# Patient Record
Sex: MALE | Race: WHITE | NOT HISPANIC OR LATINO | Employment: OTHER | URBAN - METROPOLITAN AREA
[De-identification: names, ages, dates, MRNs, and addresses within clinical notes are randomized per-mention and may not be internally consistent; named-entity substitution may affect disease eponyms.]

---

## 2024-03-04 LAB — GLUCOSE BLD MANUAL STRIP-MCNC: 158 MG/DL (ref 74–99)

## 2024-03-04 PROCEDURE — 12005 RPR S/N/A/GEN/TRK12.6-20.0CM: CPT | Mod: 59

## 2024-03-04 PROCEDURE — 99284 EMERGENCY DEPT VISIT MOD MDM: CPT | Mod: 25

## 2024-03-04 PROCEDURE — 82947 ASSAY GLUCOSE BLOOD QUANT: CPT

## 2024-03-04 RX ORDER — CYANOCOBALAMIN 1000 UG/ML
INJECTION, SOLUTION INTRAMUSCULAR; SUBCUTANEOUS
COMMUNITY
Start: 2022-12-09

## 2024-03-04 RX ORDER — METOPROLOL TARTRATE 25 MG/1
TABLET, FILM COATED ORAL
COMMUNITY
Start: 2022-12-09

## 2024-03-04 RX ORDER — DEXTROMETHORPHAN HYDROBROMIDE, GUAIFENESIN 5; 100 MG/5ML; MG/5ML
LIQUID ORAL
COMMUNITY
Start: 2022-12-09

## 2024-03-04 RX ORDER — ATORVASTATIN CALCIUM 40 MG/1
TABLET, FILM COATED ORAL
COMMUNITY
Start: 2022-12-09

## 2024-03-04 RX ORDER — ASPIRIN 81 MG/1
TABLET ORAL
COMMUNITY
Start: 2022-12-09

## 2024-03-04 RX ORDER — CLOPIDOGREL BISULFATE 75 MG/1
TABLET ORAL
COMMUNITY
Start: 2022-12-09

## 2024-03-04 RX ORDER — LISINOPRIL 2.5 MG/1
TABLET ORAL
COMMUNITY
Start: 2022-12-09

## 2024-03-04 RX ORDER — INSULIN HUMAN 100 [IU]/ML
INJECTION, SUSPENSION SUBCUTANEOUS
COMMUNITY
Start: 2022-12-09

## 2024-03-04 RX ORDER — METFORMIN HYDROCHLORIDE 1000 MG/1
TABLET ORAL
COMMUNITY
Start: 2022-12-09

## 2024-03-04 ASSESSMENT — PAIN DESCRIPTION - LOCATION: LOCATION: HAND

## 2024-03-04 ASSESSMENT — COLUMBIA-SUICIDE SEVERITY RATING SCALE - C-SSRS
1. IN THE PAST MONTH, HAVE YOU WISHED YOU WERE DEAD OR WISHED YOU COULD GO TO SLEEP AND NOT WAKE UP?: NO
6. HAVE YOU EVER DONE ANYTHING, STARTED TO DO ANYTHING, OR PREPARED TO DO ANYTHING TO END YOUR LIFE?: NO
2. HAVE YOU ACTUALLY HAD ANY THOUGHTS OF KILLING YOURSELF?: NO

## 2024-03-04 ASSESSMENT — PAIN - FUNCTIONAL ASSESSMENT: PAIN_FUNCTIONAL_ASSESSMENT: 0-10

## 2024-03-04 ASSESSMENT — PAIN DESCRIPTION - ORIENTATION: ORIENTATION: RIGHT

## 2024-03-04 ASSESSMENT — PAIN SCALES - GENERAL: PAINLEVEL_OUTOF10: 8

## 2024-03-05 ENCOUNTER — HOSPITAL ENCOUNTER (EMERGENCY)
Facility: HOSPITAL | Age: 79
Discharge: HOME | End: 2024-03-05
Attending: INTERNAL MEDICINE
Payer: MEDICARE

## 2024-03-05 ENCOUNTER — APPOINTMENT (OUTPATIENT)
Dept: RADIOLOGY | Facility: HOSPITAL | Age: 79
End: 2024-03-05
Payer: MEDICARE

## 2024-03-05 VITALS
WEIGHT: 212 LBS | HEIGHT: 75 IN | DIASTOLIC BLOOD PRESSURE: 75 MMHG | BODY MASS INDEX: 26.36 KG/M2 | SYSTOLIC BLOOD PRESSURE: 133 MMHG | TEMPERATURE: 98.6 F | HEART RATE: 93 BPM | OXYGEN SATURATION: 96 % | RESPIRATION RATE: 20 BRPM

## 2024-03-05 DIAGNOSIS — S61.401A AVULSION OF SKIN OF RIGHT HAND, INITIAL ENCOUNTER: Primary | ICD-10-CM

## 2024-03-05 LAB
ALBUMIN SERPL BCP-MCNC: 3.9 G/DL (ref 3.4–5)
ALP SERPL-CCNC: 56 U/L (ref 33–136)
ALT SERPL W P-5'-P-CCNC: 15 U/L (ref 10–52)
ANION GAP SERPL CALC-SCNC: 18 MMOL/L (ref 10–20)
APTT PPP: 30 SECONDS (ref 27–38)
AST SERPL W P-5'-P-CCNC: 20 U/L (ref 9–39)
BASOPHILS # BLD AUTO: 0.07 X10*3/UL (ref 0–0.1)
BASOPHILS NFR BLD AUTO: 0.7 %
BILIRUB SERPL-MCNC: 0.4 MG/DL (ref 0–1.2)
BUN SERPL-MCNC: 21 MG/DL (ref 6–23)
CALCIUM SERPL-MCNC: 9.7 MG/DL (ref 8.6–10.3)
CHLORIDE SERPL-SCNC: 101 MMOL/L (ref 98–107)
CO2 SERPL-SCNC: 22 MMOL/L (ref 21–32)
CREAT SERPL-MCNC: 1.09 MG/DL (ref 0.5–1.3)
EGFRCR SERPLBLD CKD-EPI 2021: 69 ML/MIN/1.73M*2
EOSINOPHIL # BLD AUTO: 0.4 X10*3/UL (ref 0–0.4)
EOSINOPHIL NFR BLD AUTO: 3.8 %
ERYTHROCYTE [DISTWIDTH] IN BLOOD BY AUTOMATED COUNT: 13.8 % (ref 11.5–14.5)
GLUCOSE SERPL-MCNC: 93 MG/DL (ref 74–99)
HCT VFR BLD AUTO: 42.5 % (ref 41–52)
HGB BLD-MCNC: 13.6 G/DL (ref 13.5–17.5)
IMM GRANULOCYTES # BLD AUTO: 0.03 X10*3/UL (ref 0–0.5)
IMM GRANULOCYTES NFR BLD AUTO: 0.3 % (ref 0–0.9)
INR PPP: 1 (ref 0.9–1.1)
LYMPHOCYTES # BLD AUTO: 2.36 X10*3/UL (ref 0.8–3)
LYMPHOCYTES NFR BLD AUTO: 22.5 %
MCH RBC QN AUTO: 27.6 PG (ref 26–34)
MCHC RBC AUTO-ENTMCNC: 32 G/DL (ref 32–36)
MCV RBC AUTO: 86 FL (ref 80–100)
MONOCYTES # BLD AUTO: 1.15 X10*3/UL (ref 0.05–0.8)
MONOCYTES NFR BLD AUTO: 11 %
NEUTROPHILS # BLD AUTO: 6.46 X10*3/UL (ref 1.6–5.5)
NEUTROPHILS NFR BLD AUTO: 61.7 %
NRBC BLD-RTO: 0 /100 WBCS (ref 0–0)
PLATELET # BLD AUTO: 220 X10*3/UL (ref 150–450)
POTASSIUM SERPL-SCNC: 5.1 MMOL/L (ref 3.5–5.3)
PROT SERPL-MCNC: 6.5 G/DL (ref 6.4–8.2)
PROTHROMBIN TIME: 11.3 SECONDS (ref 9.8–12.8)
RBC # BLD AUTO: 4.93 X10*6/UL (ref 4.5–5.9)
SODIUM SERPL-SCNC: 136 MMOL/L (ref 136–145)
WBC # BLD AUTO: 10.5 X10*3/UL (ref 4.4–11.3)

## 2024-03-05 PROCEDURE — 85610 PROTHROMBIN TIME: CPT | Performed by: INTERNAL MEDICINE

## 2024-03-05 PROCEDURE — 90471 IMMUNIZATION ADMIN: CPT | Performed by: INTERNAL MEDICINE

## 2024-03-05 PROCEDURE — 96374 THER/PROPH/DIAG INJ IV PUSH: CPT | Mod: 59

## 2024-03-05 PROCEDURE — 29125 APPL SHORT ARM SPLINT STATIC: CPT | Mod: RT

## 2024-03-05 PROCEDURE — 2500000004 HC RX 250 GENERAL PHARMACY W/ HCPCS (ALT 636 FOR OP/ED): Performed by: INTERNAL MEDICINE

## 2024-03-05 PROCEDURE — 80053 COMPREHEN METABOLIC PANEL: CPT | Performed by: INTERNAL MEDICINE

## 2024-03-05 PROCEDURE — 36415 COLL VENOUS BLD VENIPUNCTURE: CPT | Performed by: INTERNAL MEDICINE

## 2024-03-05 PROCEDURE — 2500000005 HC RX 250 GENERAL PHARMACY W/O HCPCS: Performed by: INTERNAL MEDICINE

## 2024-03-05 PROCEDURE — 73130 X-RAY EXAM OF HAND: CPT | Mod: RT

## 2024-03-05 PROCEDURE — 90715 TDAP VACCINE 7 YRS/> IM: CPT | Performed by: INTERNAL MEDICINE

## 2024-03-05 PROCEDURE — 85025 COMPLETE CBC W/AUTO DIFF WBC: CPT | Performed by: INTERNAL MEDICINE

## 2024-03-05 PROCEDURE — 73130 X-RAY EXAM OF HAND: CPT | Mod: RIGHT SIDE | Performed by: RADIOLOGY

## 2024-03-05 RX ORDER — LIDOCAINE HYDROCHLORIDE AND EPINEPHRINE 10; 10 MG/ML; UG/ML
10 INJECTION, SOLUTION INFILTRATION; PERINEURAL ONCE
Status: COMPLETED | OUTPATIENT
Start: 2024-03-05 | End: 2024-03-05

## 2024-03-05 RX ADMIN — LIDOCAINE HYDROCHLORIDE AND EPINEPHRINE 10 ML: 10; 10 INJECTION, SOLUTION INFILTRATION; PERINEURAL at 01:20

## 2024-03-05 RX ADMIN — HYDROMORPHONE HYDROCHLORIDE 0.2 MG: 0.2 INJECTION, SOLUTION INTRAMUSCULAR; INTRAVENOUS; SUBCUTANEOUS at 01:45

## 2024-03-05 RX ADMIN — TETANUS TOXOID, REDUCED DIPHTHERIA TOXOID AND ACELLULAR PERTUSSIS VACCINE, ADSORBED 0.5 ML: 5; 2.5; 8; 8; 2.5 SUSPENSION INTRAMUSCULAR at 01:20

## 2024-03-05 NOTE — ED PROVIDER NOTES
"HPI     CC: Hand Injury     HPI: Peter Loredo is a 78 y.o. 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  Patient fell against a door handle around 2:00 PM today, avulsing the skin of the right hand dorsum.  He is currently taking cefuroxime which she was prescribed for some cold symptoms.  A family member who is a nurse practitioner bandaged the wound but he reportedly \"lost a lot of blood.\"  He denies any dizziness, chest pain, shortness of breath, or other symptoms leading to the fall.  Attributes it to his chronic balance issues.  He denies any numbness, tingling, change in color to the hand other than some bruising and swelling.    ROS: 10-point review of systems was performed and is otherwise negative except as noted in HPI.    Limitations to history: N/A    Independent Historians: Multiple family members at bedside    External Records Reviewed: N/A    Past Medical History: Noncontributory except per HPI     Past Surgical History: Noncontributory except per HPI     Family History: Reviewed and noncontributory     Social History:  Denies tobacco. Denies ETOH. Denies illicit drugs.    Social Determinants Affecting Care: N/A    Allergies   Allergen Reactions    Ciprofloxacin (Bulk) Anaphylaxis       Home Meds:   Current Outpatient Medications   Medication Instructions    acetaminophen (Tylenol Arthritis Pain) 650 mg ER tablet 2 tablet EVERY 8 HOURS (route: oral)    aspirin 81 mg EC tablet 1 tablet DAILY (route: oral)    atorvastatin (Lipitor) 40 mg tablet 1 tablet DAILY (route: oral)    clopidogrel (Plavix) 75 mg tablet 1 tablet DAILY (route: oral)    cyanocobalamin (Vitamin B-12) 1,000 mcg/mL injection 1 mL MONTHLY (route: injection)    insulin NPH and regular human (HumuLIN 70/30 U-100 KwikPen) 100 unit/mL (70-30) injection Per instructions 2 TIMES DAILY (route: subcutaneous)    lisinopril 2.5 mg tablet 1 tablet DAILY (route: oral)    " "metFORMIN (Glucophage) 1,000 mg tablet 1 tablet 2 TIMES DAILY (route: oral)    metoprolol tartrate (Lopressor) 25 mg tablet 12.5 mg DAILY (route: oral)        Physical Exam     ED Triage Vitals [03/04/24 2024]   Temperature Heart Rate Respirations BP   37 °C (98.6 °F) 90 18 131/75      Pulse Ox Temp src Heart Rate Source Patient Position   95 % -- -- --      BP Location FiO2 (%)     -- --         Heart Rate:  [89-90]   Temperature:  [37 °C (98.6 °F)]   Respirations:  [18-20]   BP: (131)/(71-75)   Height:  [190.5 cm (6' 3\")]   Weight:  [96.2 kg (212 lb)]   Pulse Ox:  [95 %-96 %]      Physical Exam  Vitals and nursing note reviewed.     CONSTITUTIONAL: Well appearing, well nourished, in no acute distress.   HENMT: Head atraumatic. Airway patent. Nasal mucosa clear. Mouth with normal mucosa, clear oropharynx. Uvula midline. Neck supple.    EYES: Clear bilaterally, pupils equally round and reactive to light.   CARDIOVASCULAR: Normal rate, regular rhythm.  Heart sounds S1, S2.  No murmurs, rubs or gallops. Normal pulses. Capillary refill < 2 sec.   RESPIRATORY: No increased work of breathing. Breath sounds clear and equal bilaterally.  GASTROINTESTINAL: Abdomen soft, non-distended, non-tender. No rebound, no guarding. Normal bowel sounds. No palpable masses.  GENITOURINARY:  No CVA tenderness.  MUSCULOSKELETAL: Large can avulsion R hand dorsum with visible vasculature and extensor tendons, clotted blood and mild oozing, no pulsatile flow.  SILT, FROM, 2+ RP.  Spine appears normal, range of motion is not limited, no muscle or joint tenderness. No edema.   NEUROLOGICAL: Alert and oriented, no asymmetry, moving all extremities equally.  SKIN: Warm, dry and intact. No rash or notable lesions.  PSYCHIATRIC: Normal mood and affect.  HEME/LYMPH: No adenopathy or splenomegaly.      Diagnostic Results      ECG: ECGs read and interpreted by me. See ED Course, below, for interpretation.    Labs Reviewed   CBC WITH AUTO DIFFERENTIAL " - Abnormal       Result Value    WBC 10.5      nRBC 0.0      RBC 4.93      Hemoglobin 13.6      Hematocrit 42.5      MCV 86      MCH 27.6      MCHC 32.0      RDW 13.8      Platelets 220      Neutrophils % 61.7      Immature Granulocytes %, Automated 0.3      Lymphocytes % 22.5      Monocytes % 11.0      Eosinophils % 3.8      Basophils % 0.7      Neutrophils Absolute 6.46 (*)     Immature Granulocytes Absolute, Automated 0.03      Lymphocytes Absolute 2.36      Monocytes Absolute 1.15 (*)     Eosinophils Absolute 0.40      Basophils Absolute 0.07     POCT GLUCOSE - Abnormal    POCT Glucose 158 (*)    COMPREHENSIVE METABOLIC PANEL - Normal    Glucose 93      Sodium 136      Potassium 5.1      Chloride 101      Bicarbonate 22      Anion Gap 18      Urea Nitrogen 21      Creatinine 1.09      eGFR 69      Calcium 9.7      Albumin 3.9      Alkaline Phosphatase 56      Total Protein 6.5      AST 20      Bilirubin, Total 0.4      ALT 15     COAGULATION SCREEN - Normal    Protime 11.3      INR 1.0      aPTT 30      Narrative:     The APTT is no longer used for monitoring Unfractionated Heparin Therapy. For monitoring Heparin Therapy, use the Heparin Assay.         XR hand right 3+ views   Final Result   No acute osseous abnormality identified.  Soft tissue injury along the   dorsal aspect of the hand without evidence of foreign body.   Signed by Jourdan Osorio                    [place] Coma Scale Score: 15                  Procedure  Laceration Repair    Performed by: Annabelle Conti MD  Authorized by: Annabelle Conti MD    Consent:     Consent obtained:  Verbal    Consent given by:  Patient    Risks, benefits, and alternatives were discussed: yes    Universal protocol:     Procedure explained and questions answered to patient or proxy's satisfaction: yes      Patient identity confirmed:  Verbally with patient  Anesthesia:     Anesthesia method:  Local infiltration    Local anesthetic:  Lidocaine 1% WITH  epi  Laceration details:     Location:  Hand    Hand location:  R hand, dorsum    Length (cm):  20    Depth (mm):  5  Pre-procedure details:     Preparation:  Patient was prepped and draped in usual sterile fashion and imaging obtained to evaluate for foreign bodies  Exploration:     Limited defect created (wound extended): no      Hemostasis achieved with:  Direct pressure    Imaging obtained: x-ray      Imaging outcome: foreign body not noted      Wound exploration: wound explored through full range of motion      Wound extent: no foreign bodies/material noted, no muscle damage noted, no nerve damage noted, no tendon damage noted, no underlying fracture noted and no vascular damage noted      Contaminated: no    Treatment:     Area cleansed with:  Saline    Amount of cleaning:  Extensive    Irrigation solution:  Sterile saline    Irrigation volume:  50    Irrigation method:  Pressure wash    Debridement:  None    Undermining:  None    Scar revision: no    Skin repair:     Repair method:  Sutures    Suture size:  5-0    Suture material:  Prolene    Suture technique:  Simple interrupted    Number of sutures:  11  Approximation:     Approximation:  Loose  Repair type:     Repair type:  Intermediate  Post-procedure details:     Dressing:  Bulky dressing and splint for protection (xeroform)    Procedure completion:  Tolerated well, no immediate complications      ED Course & MDM   Assessment/Plan:   Peter Loredo is a 78 y.o. 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  He is neurovascularly intact and wound is hemostatic at this time.  He was given tetanus and a dose of Dilaudid for pain.  He is already receiving cefuroxime for a respiratory infection. See below for details of ED course and ultimate disposition.    Medications   diphth,pertus(acell),tetanus (BoostRIX) 2.5-8-5  Lf-mcg-Lf/0.5mL vaccine 0.5 mL (has no administration in time range)   HYDROmorphone PF (Dilaudid) injection 0.2 mg (has no administration in time range)   lidocaine-epinephrine (Xylocaine W/EPI) 1 %-1:100,000 injection 10 mL (10 mL infiltration Given 3/5/24 0120)        ED Course as of 03/07/24 1542   Tue Mar 05, 2024   0204 X-ray reviewed with no foreign body or fracture.  Labs unremarkable including normal hemoglobin.  Spoke with Dr. Marks from hand. He recommends light irrigation, tacking stitches, xeroform, bulky gauze, and a volar splint, with Ortho hand follow-up.  This was performed with 11 5-0 nonabsorbable sutures.  Medic applied volar splint at bedside.  Patient tolerated the procedure well.  Referral was made for follow-up with Dr. Marks.  Patient was discharged home with anticipatory guidance and strict return precautions. [CG]      ED Course User Index  [CG] Annabelle Conti MD         Diagnoses as of 03/07/24 1542   Avulsion of skin of right hand, initial encounter       Disposition:   DISCHARGE.  The patient was discharged with both verbal and written anticipatory guidance and strict return precautions. Discharge diagnosis, instructions and plan were discussed and understood. I emphasized the importance of following up with their primary care provider within 24-48 hours and with any referrals in the timeframe recommended. At the time of discharge the patient was comfortable and was in no apparent distress. Patient is aware of diagnostic uncertainty and was notified though testing is negative here, there is a very small chance that pathology may be missed.  The patient understands these risks and the patient/family understood to call 911 or return immediately to the emergency department if the symptoms worsen or if they have any additional concerns.      FOLLOW UP  Primary care provider in 1-2 days.      ED Prescriptions    None         Annabelle Conti MD  EM/IM/Peds    This note was  dictated by speech recognition. Minor errors in transcription may be present.     Annabelle Conti MD  03/07/24 0729

## 2024-03-05 NOTE — DISCHARGE INSTRUCTIONS
You were seen today for a hand avulsion injury.  We closed your wound with 11 sutures, applied a Xeroform dressing, bulky splint and you should follow-up and have sutures removed with Ortho hand within a week.  Your evaluation was not concerning for an emergency at this time. Please see the attached information sheet for information about your condition, how to care for your condition at home, and reasons to return to the emergency department. Take any prescriptions written today as prescribed. You should call your primary care provider within 24 hours to tell them about today's visit, including any new medications or medication changes, as he or she may want to see you in the office for further evaluation. If you do not have a primary care provider, call  [phone] for an appointment. We offer in-person office visits as well as virtual options. Please do not hesitate to call  [phone] or return to the emergency department with any new or unresolved concerns or symptoms. Thank you for choosing [facility] for your care.

## 2024-03-05 NOTE — ED TRIAGE NOTES
PT PRESENTS TO THE ED FOR HAND INJURY TODAY AT HOME. PT FELL AND TORE HIS SKIN ON HIS HAND. PER FAMILY HE HAS VERY THIN SKIN AND LEEROY THRW IT. PER FAMILY YOU WERE ABLE TO SEE BONES. PTS HAND IS CURRENTLY WRAPPED AND NOT BLEEDING THROUGH. PT STATES THAT HE IS A DIABETIC AND ON BLOOD THINNERS. PT DENIES PASSING OUT. PT DENIES HEAD, NECK OR BACK PAIN. PT HAS HX OF STENT IN PAST AND THAT IS WHY HE IS ON BLOOD THINNERS. PT DENIES CHEST PAIN OR SOB.

## 2024-03-10 NOTE — PROGRESS NOTES
Plastic Surgery Clinic Visit    Patient Name: Peter Loredo  MRN: [id number]    History of Present Illness  Peter Loredo is a 78 y.o. 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 ED on 3/5 with right hand injury after he fell against a door handle, avulsing the skin of the right hand dorsum. Attributes the fall to his chronic balance issues. X-ray with no foreign body or fracture. Ortho hand consulted in the ED and recommended light irrigation, tacking stitches, xeroform, bulky gauze, and a volar splint. Prior to repair, R hand dorsum with visible vasculature and extensor tendons. Repaired in the ED with 5-0 prolene. Presents to plastic surgery clinic today for wound evaluation and possible need for soft tissue coverage.     Subjective  Wife and niece at bedside. Patient states that his bulky dressing has not been removed since it was initially placed when he had his injury. Notes pain over his wound when he flexes his wrist. Denies fever, chest pain, SOB, abd pain, n/v/d, numbness, tingling to R hand.    Past Medical History:   Diagnosis Date    Aortic aneurysm (CMS/HCC)     Diabetes mellitus (CMS/HCC)     Hypertension      Past Surgical History:   Procedure Laterality Date    BYPASS GRAFT      X4     Allergies   Allergen Reactions    Ciprofloxacin (Bulk) Anaphylaxis       Current Outpatient Medications:     acetaminophen (Tylenol Arthritis Pain) 650 mg ER tablet, 2 tablet EVERY 8 HOURS (route: oral), Disp: , Rfl:     aspirin 81 mg EC tablet, 1 tablet DAILY (route: oral), Disp: , Rfl:     atorvastatin (Lipitor) 40 mg tablet, 1 tablet DAILY (route: oral), Disp: , Rfl:     clopidogrel (Plavix) 75 mg tablet, 1 tablet DAILY (route: oral), Disp: , Rfl:     cyanocobalamin (Vitamin B-12) 1,000 mcg/mL injection, 1 mL MONTHLY (route: injection), Disp: , Rfl:     insulin NPH and regular human (HumuLIN 70/30 U-100 KwikPen) 100 unit/mL (70-30)  injection, Per instructions 2 TIMES DAILY (route: subcutaneous), Disp: , Rfl:     lisinopril 2.5 mg tablet, 1 tablet DAILY (route: oral), Disp: , Rfl:     metFORMIN (Glucophage) 1,000 mg tablet, 1 tablet 2 TIMES DAILY (route: oral), Disp: , Rfl:     metoprolol tartrate (Lopressor) 25 mg tablet, 12.5 mg DAILY (route: oral), Disp: , Rfl:    No family history on file.  Social History     Tobacco Use    Smoking status: Former     Types: Cigarettes     Quit date:      Years since quittin.2    Smokeless tobacco: Never   Substance Use Topics    Drug use: Never     Objective  Physical Exam   Constitutional: NAD  Eyes: EOMI, clear sclera   ENMT: Moist mucous membranes, no apparent injuries or lesions  Head/Neck: NCAT  Cardiovascular: Extremities WWP  Respiratory/Thorax: Unlabored respirations on RA  Gastrointestinal: Abdomen soft, non-distended, non-tender  Extremities: RUE: R hand dorsum incision c/d/intact with non-absorbable sutures. SILT. NV intact. Limited wrist ROM 2/2 wound tenderness. Mild UE edema. Wound without bleeding, drainage. Bruising to digits 1-2.  Neurological: A&Ox3  Psychological: Appropriate mood and behavior  Skin: Warm and dry with no lesions or rashes        Diagnostics   XR hand right 3+ views  Result Date: 3/5/2024  STUDY: Hand Radiographs; 3/5/2024 01:42AM INDICATION: Right hand laceration. COMPARISON: None available. ACCESSION NUMBER(S): [id number] ORDERING CLINICIAN: TOMMIE RICARDO TECHNIQUE:  Three view(s) of the right hand. FINDINGS:  There is no displaced fracture.  The alignment is anatomic. Generalized osseous demineralization is noted.  There is mild joint space narrowing throughout the interphalangeal joints.  Mild joint space narrowing is seen of the MCP joints.  Soft tissue injury and swelling seen along the dorsal aspect of the hand. No acute osseous abnormality identified.  Soft tissue injury along the dorsal aspect of the hand without evidence of foreign body. Signed by  Jourdan Osorio    Assessment/Plan  Plan for local wound care at this time and allow wound to declare itself. Patient may need future soft tissue coverage/STSG if he continues to have delayed/poor wound healing.     #Wound to dorsum of R hand  - Wound care: Apply 0.5-1 inch Nitrobid paste to the wound, apply xeroform, abd pad, kerlix wrap, splint twice a day.   - Nitrobid for 5 days only  - After 5 days, discontinue nitropaste and continue with xeroform only  - Patient educated on potential side effects of nitropaste which include but are not limited to HA, dizziness, lightheadedness, hypotension. Due to patient's high risk of falling, patient and wife educated to monitor for these signs/symptoms. Instructed to reach out to our office if any of these symptoms occur.  - Monitor for signs of infection which include increased redness, swelling, fever/chills, green/yellow drainage, or foul odor from incision  - Follow up 3/18 for wound monitoring and suture removal   - If patient continues to need local wound care after subsequent visits, may need to establish home care at next follow up appointment for wound supplies. Wife states she has plenty of supplies in the interim.    Patient and plan discussed with BASILIO Rivera-C  Plastic and Reconstructive Surgery  Epic chat, Pager [phone], Team phones: [phone]

## 2024-03-11 ENCOUNTER — OFFICE VISIT (OUTPATIENT)
Dept: PLASTIC SURGERY | Facility: CLINIC | Age: 79
End: 2024-03-11
Payer: MEDICARE

## 2024-03-11 VITALS — SYSTOLIC BLOOD PRESSURE: 114 MMHG | DIASTOLIC BLOOD PRESSURE: 79 MMHG | HEART RATE: 118 BPM

## 2024-03-11 DIAGNOSIS — Z48.00 CHANGE OR REMOVAL OF NONSURGICAL WOUND DRESSING: ICD-10-CM

## 2024-03-11 DIAGNOSIS — S61.401A AVULSION OF SKIN OF HAND, RIGHT, INITIAL ENCOUNTER: Primary | ICD-10-CM

## 2024-03-11 PROCEDURE — 1036F TOBACCO NON-USER: CPT

## 2024-03-11 PROCEDURE — 99203 OFFICE O/P NEW LOW 30 MIN: CPT

## 2024-03-11 PROCEDURE — 1159F MED LIST DOCD IN RCRD: CPT

## 2024-03-11 PROCEDURE — 1125F AMNT PAIN NOTED PAIN PRSNT: CPT

## 2024-03-11 RX ORDER — NITROGLYCERIN 20 MG/G
0.5 OINTMENT TOPICAL EVERY 12 HOURS
Qty: 1 G | Refills: 0 | Status: SHIPPED | OUTPATIENT
Start: 2024-03-11 | End: 2024-03-16

## 2024-03-11 NOTE — PATIENT INSTRUCTIONS
- Wound care: Apply 0.5inch Nitrobid paste to the wound, apply xeroform, pad, kerlix wrap, splint twice a day.   - Nitrobid for 5 days only  - After 5 days, discontinue nitropaste and continue with xeroform only  - Monitor for signs of infection which include increased redness, swelling, fever/chills, green/yellow drainage, or foul odor from incision  - Follow up 3/18 for wound monitoring and suture removal   - [email or web address]

## 2024-03-18 ENCOUNTER — OFFICE VISIT (OUTPATIENT)
Dept: PLASTIC SURGERY | Facility: CLINIC | Age: 79
End: 2024-03-18
Payer: MEDICARE

## 2024-03-18 VITALS
DIASTOLIC BLOOD PRESSURE: 79 MMHG | RESPIRATION RATE: 16 BRPM | HEIGHT: 75 IN | BODY MASS INDEX: 26.42 KG/M2 | WEIGHT: 212.5 LBS | HEART RATE: 81 BPM | SYSTOLIC BLOOD PRESSURE: 119 MMHG

## 2024-03-18 DIAGNOSIS — S61.401A AVULSION OF SKIN OF HAND, RIGHT, INITIAL ENCOUNTER: Primary | ICD-10-CM

## 2024-03-18 PROCEDURE — 1159F MED LIST DOCD IN RCRD: CPT | Performed by: PHYSICIAN ASSISTANT

## 2024-03-18 PROCEDURE — 1036F TOBACCO NON-USER: CPT | Performed by: PHYSICIAN ASSISTANT

## 2024-03-18 PROCEDURE — 99213 OFFICE O/P EST LOW 20 MIN: CPT

## 2024-03-18 PROCEDURE — 1125F AMNT PAIN NOTED PAIN PRSNT: CPT | Performed by: PHYSICIAN ASSISTANT

## 2024-03-18 RX ORDER — MIDODRINE HYDROCHLORIDE 2.5 MG/1
2.5 TABLET ORAL 3 TIMES DAILY
COMMUNITY
Start: 2023-08-14

## 2024-03-18 ASSESSMENT — PAIN SCALES - GENERAL: PAINLEVEL: 2

## 2024-03-18 NOTE — PROGRESS NOTES
Plastic Surgery Clinic Visit    Patient Name: Peter Loredo  MRN: [id number]    History of Present Illness  Peter Loredo is a 78 y.o. 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 Attributes the fall to his chronic balance issues. X-ray with no foreign body or fracture. Ortho hand consulted in the ED and recommended light irrigation, tacking stitches, xeroform, bulky gauze, and a volar splint. Prior to repair, R hand dorsum with visible vasculature and extensor tendons. Repaired in the ED with 5-0 prolene. Presents to plastic surgery clinic today for wound evaluation and possible need for soft tissue coverage.     Subjective  Wife and niece at bedside. Patient states that his bulky dressing has not been removed since it was initially placed when he had his injury. Notes pain over his wound when he flexes his wrist. Denies fever, chest pain, SOB, abd pain, n/v/d, numbness, tingling to R hand.    Past Medical History:   Diagnosis Date    Aortic aneurysm (CMS/HCC)     Diabetes mellitus (CMS/HCC)     Hypertension      Past Surgical History:   Procedure Laterality Date    BYPASS GRAFT      X4     Allergies   Allergen Reactions    Ciprofloxacin (Bulk) Anaphylaxis       Current Outpatient Medications:     acetaminophen (Tylenol Arthritis Pain) 650 mg ER tablet, 2 tablet EVERY 8 HOURS (route: oral), Disp: , Rfl:     aspirin 81 mg EC tablet, 1 tablet DAILY (route: oral), Disp: , Rfl:     atorvastatin (Lipitor) 40 mg tablet, 1 tablet DAILY (route: oral), Disp: , Rfl:     clopidogrel (Plavix) 75 mg tablet, 1 tablet DAILY (route: oral), Disp: , Rfl:     cyanocobalamin (Vitamin B-12) 1,000 mcg/mL injection, 1 mL MONTHLY (route: injection), Disp: , Rfl:     insulin NPH and regular human (HumuLIN 70/30 U-100 KwikPen) 100 unit/mL (70-30)  injection, Per instructions 2 TIMES DAILY (route: subcutaneous), Disp: , Rfl:     lisinopril 2.5 mg tablet, 1 tablet DAILY (route: oral), Disp: , Rfl:     metFORMIN (Glucophage) 1,000 mg tablet, 1 tablet 2 TIMES DAILY (route: oral), Disp: , Rfl:     metoprolol tartrate (Lopressor) 25 mg tablet, 12.5 mg DAILY (route: oral), Disp: , Rfl:     nitroglycerin (Nitro-Bid) 2 % ointment, Place 0.5 inches on the skin every 12 hours for 5 days., Disp: 1 g, Rfl: 0   No family history on file.  Social History     Tobacco Use    Smoking status: Former     Types: Cigarettes     Quit date:      Years since quittin.2    Smokeless tobacco: Never   Substance Use Topics    Drug use: Never     Objective  Physical Exam   Constitutional: NAD  Eyes: EOMI, clear sclera   ENMT: Moist mucous membranes, no apparent injuries or lesions  Head/Neck: NCAT  Cardiovascular: Extremities WWP  Respiratory/Thorax: Unlabored respirations on RA  Gastrointestinal: Abdomen soft, non-distended, non-tender  Extremities: RUE: R hand dorsum incision c/d/intact with non-absorbable sutures. SILT. NV intact. Limited wrist ROM 2/2 wound tenderness. Mild UE edema. Wound without bleeding, drainage. Bruising to digits 1-2.  Neurological: A&Ox3  Psychological: Appropriate mood and behavior  Skin: Warm and dry with no lesions or rashes        Diagnostics   XR hand right 3+ views  Result Date: 3/5/2024  STUDY: Hand Radiographs; 3/5/2024 01:42AM INDICATION: Right hand laceration. COMPARISON: None available. ACCESSION NUMBER(S): [id number] ORDERING CLINICIAN: TOMMIE RICARDO TECHNIQUE:  Three view(s) of the right hand. FINDINGS:  There is no displaced fracture.  The alignment is anatomic. Generalized osseous demineralization is noted.  There is mild joint space narrowing throughout the interphalangeal joints.  Mild joint space narrowing is seen of the MCP joints.  Soft tissue injury and swelling seen along the dorsal aspect of the hand. No acute osseous  abnormality identified.  Soft tissue injury along the dorsal aspect of the hand without evidence of foreign body. Signed by Jourdan Osorio    Assessment/Plan  Plan for local wound care at this time and allow wound to declare itself. Patient may need future soft tissue coverage/STSG if he continues to have delayed/poor wound healing.     #Wound to dorsum of R hand  - Peter has completed nitr-paste for 5 days with good results, discontinue nitropaste and continue with xeroform only  - 3 sutures removed with 5 sutures remaining in place  - Return to clinic in 3 weeks for remaining suture removal    Katelynn Rodarte PA-C  Plastic and Reconstructive Surgery  Epic chat, Pager [phone], Team phones: [phone]

## 2024-04-08 ENCOUNTER — APPOINTMENT (OUTPATIENT)
Dept: PLASTIC SURGERY | Facility: CLINIC | Age: 79
End: 2024-04-08
Payer: MEDICARE

## 2024-04-15 ENCOUNTER — CLINICAL SUPPORT (OUTPATIENT)
Dept: EMERGENCY MEDICINE | Facility: HOSPITAL | Age: 79
End: 2024-04-15
Payer: MEDICARE

## 2024-04-15 ENCOUNTER — OFFICE VISIT (OUTPATIENT)
Dept: PLASTIC SURGERY | Facility: CLINIC | Age: 79
End: 2024-04-15
Payer: MEDICARE

## 2024-04-15 ENCOUNTER — HOSPITAL ENCOUNTER (EMERGENCY)
Facility: HOSPITAL | Age: 79
Discharge: HOME | End: 2024-04-15
Attending: EMERGENCY MEDICINE
Payer: MEDICARE

## 2024-04-15 VITALS
OXYGEN SATURATION: 98 % | WEIGHT: 220 LBS | TEMPERATURE: 97.2 F | HEART RATE: 81 BPM | RESPIRATION RATE: 16 BRPM | BODY MASS INDEX: 27.5 KG/M2 | DIASTOLIC BLOOD PRESSURE: 98 MMHG | SYSTOLIC BLOOD PRESSURE: 131 MMHG

## 2024-04-15 VITALS
TEMPERATURE: 98.3 F | DIASTOLIC BLOOD PRESSURE: 68 MMHG | OXYGEN SATURATION: 96 % | SYSTOLIC BLOOD PRESSURE: 121 MMHG | HEART RATE: 58 BPM

## 2024-04-15 DIAGNOSIS — I49.9 IRREGULAR HEART BEAT: Primary | ICD-10-CM

## 2024-04-15 DIAGNOSIS — Z48.00 CHANGE OR REMOVAL OF NONSURGICAL WOUND DRESSING: ICD-10-CM

## 2024-04-15 DIAGNOSIS — S61.401A AVULSION OF SKIN OF HAND, RIGHT, INITIAL ENCOUNTER: Primary | ICD-10-CM

## 2024-04-15 DIAGNOSIS — Z48.02 ENCOUNTER FOR REMOVAL OF SUTURES: ICD-10-CM

## 2024-04-15 LAB
ALBUMIN SERPL BCP-MCNC: 3.9 G/DL (ref 3.4–5)
ALP SERPL-CCNC: 65 U/L (ref 33–136)
ALT SERPL W P-5'-P-CCNC: 11 U/L (ref 10–52)
ANION GAP SERPL CALC-SCNC: 14 MMOL/L (ref 10–20)
AST SERPL W P-5'-P-CCNC: 12 U/L (ref 9–39)
ATRIAL RATE: 82 BPM
BASOPHILS # BLD AUTO: 0.07 X10*3/UL (ref 0–0.1)
BASOPHILS NFR BLD AUTO: 0.8 %
BILIRUB SERPL-MCNC: 0.3 MG/DL (ref 0–1.2)
BUN SERPL-MCNC: 24 MG/DL (ref 6–23)
CALCIUM SERPL-MCNC: 9.7 MG/DL (ref 8.6–10.6)
CARDIAC TROPONIN I PNL SERPL HS: 5 NG/L (ref 0–53)
CHLORIDE SERPL-SCNC: 105 MMOL/L (ref 98–107)
CO2 SERPL-SCNC: 25 MMOL/L (ref 21–32)
CREAT SERPL-MCNC: 1.13 MG/DL (ref 0.5–1.3)
EGFRCR SERPLBLD CKD-EPI 2021: 67 ML/MIN/1.73M*2
EOSINOPHIL # BLD AUTO: 0.39 X10*3/UL (ref 0–0.4)
EOSINOPHIL NFR BLD AUTO: 4.6 %
ERYTHROCYTE [DISTWIDTH] IN BLOOD BY AUTOMATED COUNT: 13.6 % (ref 11.5–14.5)
GLUCOSE SERPL-MCNC: 74 MG/DL (ref 74–99)
HCT VFR BLD AUTO: 39.4 % (ref 41–52)
HGB BLD-MCNC: 13.4 G/DL (ref 13.5–17.5)
IMM GRANULOCYTES # BLD AUTO: 0.03 X10*3/UL (ref 0–0.5)
IMM GRANULOCYTES NFR BLD AUTO: 0.4 % (ref 0–0.9)
LYMPHOCYTES # BLD AUTO: 2.16 X10*3/UL (ref 0.8–3)
LYMPHOCYTES NFR BLD AUTO: 25.6 %
MCH RBC QN AUTO: 28.1 PG (ref 26–34)
MCHC RBC AUTO-ENTMCNC: 34 G/DL (ref 32–36)
MCV RBC AUTO: 83 FL (ref 80–100)
MONOCYTES # BLD AUTO: 0.87 X10*3/UL (ref 0.05–0.8)
MONOCYTES NFR BLD AUTO: 10.3 %
NEUTROPHILS # BLD AUTO: 4.91 X10*3/UL (ref 1.6–5.5)
NEUTROPHILS NFR BLD AUTO: 58.3 %
NRBC BLD-RTO: 0 /100 WBCS (ref 0–0)
P AXIS: 27 DEGREES
P OFFSET: 200 MS
P ONSET: 140 MS
PLATELET # BLD AUTO: 209 X10*3/UL (ref 150–450)
POTASSIUM SERPL-SCNC: 4.5 MMOL/L (ref 3.5–5.3)
PR INTERVAL: 162 MS
PROT SERPL-MCNC: 6.9 G/DL (ref 6.4–8.2)
Q ONSET: 221 MS
QRS COUNT: 13 BEATS
QRS DURATION: 74 MS
QT INTERVAL: 350 MS
QTC CALCULATION(BAZETT): 408 MS
QTC FREDERICIA: 388 MS
R AXIS: 50 DEGREES
RBC # BLD AUTO: 4.77 X10*6/UL (ref 4.5–5.9)
SODIUM SERPL-SCNC: 139 MMOL/L (ref 136–145)
T AXIS: -57 DEGREES
T OFFSET: 396 MS
VENTRICULAR RATE: 82 BPM
WBC # BLD AUTO: 8.4 X10*3/UL (ref 4.4–11.3)

## 2024-04-15 PROCEDURE — 99285 EMERGENCY DEPT VISIT HI MDM: CPT | Performed by: EMERGENCY MEDICINE

## 2024-04-15 PROCEDURE — 93005 ELECTROCARDIOGRAM TRACING: CPT

## 2024-04-15 PROCEDURE — 99214 OFFICE O/P EST MOD 30 MIN: CPT

## 2024-04-15 PROCEDURE — 1036F TOBACCO NON-USER: CPT | Performed by: PHYSICIAN ASSISTANT

## 2024-04-15 PROCEDURE — 80053 COMPREHEN METABOLIC PANEL: CPT | Performed by: EMERGENCY MEDICINE

## 2024-04-15 PROCEDURE — 84484 ASSAY OF TROPONIN QUANT: CPT | Performed by: EMERGENCY MEDICINE

## 2024-04-15 PROCEDURE — 1159F MED LIST DOCD IN RCRD: CPT | Performed by: PHYSICIAN ASSISTANT

## 2024-04-15 PROCEDURE — 85025 COMPLETE CBC W/AUTO DIFF WBC: CPT | Performed by: EMERGENCY MEDICINE

## 2024-04-15 PROCEDURE — 36415 COLL VENOUS BLD VENIPUNCTURE: CPT | Performed by: EMERGENCY MEDICINE

## 2024-04-15 PROCEDURE — 1126F AMNT PAIN NOTED NONE PRSNT: CPT | Performed by: PHYSICIAN ASSISTANT

## 2024-04-15 PROCEDURE — 99283 EMERGENCY DEPT VISIT LOW MDM: CPT | Mod: 25

## 2024-04-15 ASSESSMENT — ENCOUNTER SYMPTOMS
DYSURIA: 0
DIARRHEA: 0
NAUSEA: 0
MYALGIAS: 0
CHILLS: 0
COUGH: 0
FEVER: 0
LIGHT-HEADEDNESS: 0
FATIGUE: 0
SHORTNESS OF BREATH: 0
HEADACHES: 0
ABDOMINAL PAIN: 0
SLEEP DISTURBANCE: 0
ABDOMINAL DISTENTION: 0
NUMBNESS: 0
CONSTIPATION: 0
WOUND: 1
DIAPHORESIS: 0
DIZZINESS: 0
VOMITING: 0
CHEST TIGHTNESS: 0
WEAKNESS: 0
BRUISES/BLEEDS EASILY: 0
CONFUSION: 0
PALPITATIONS: 0
RHINORRHEA: 0

## 2024-04-15 ASSESSMENT — COLUMBIA-SUICIDE SEVERITY RATING SCALE - C-SSRS
2. HAVE YOU ACTUALLY HAD ANY THOUGHTS OF KILLING YOURSELF?: NO
1. IN THE PAST MONTH, HAVE YOU WISHED YOU WERE DEAD OR WISHED YOU COULD GO TO SLEEP AND NOT WAKE UP?: NO
6. HAVE YOU EVER DONE ANYTHING, STARTED TO DO ANYTHING, OR PREPARED TO DO ANYTHING TO END YOUR LIFE?: NO

## 2024-04-15 ASSESSMENT — PAIN SCALES - GENERAL: PAINLEVEL: 0-NO PAIN

## 2024-04-15 NOTE — ED TRIAGE NOTES
Pt presents to the ED from a plastic surgery appt due to an irregular HR. Per family pt HR was fluctuating between 80 and 45 and they wanted him to get a EKG. Pt denies CP or SOB but states he does have some L Rib pain.

## 2024-04-15 NOTE — ED PROVIDER NOTES
CC: Irregular Heart Beat     HPI:  Patient is a 78-year-old male with a history of HTN, T2DM, stroke on DAPT, and CAD s/p bypass X4 who presented to the ED for irregular heartbeat.  Patient was noted to be at his plastic surgery appointment today for follow-up s/p a degloving accident.  Patient noted significant improvement of his healing from his degloving accident.  Patient was noted to have a fluctuating heart rate at 75 with became bradycardic to 45.  Patient was noted to be asymptomatic at that time.  Patient was advised presents to the ED.  Patient notes that he is in his usual state of health and is companied by his wife.  Wife also notes that the patient is at his baseline.  Patient notes being hungry and that they live approximately 2 hours away.  Denied chest pain, difficulty breathing, nausea, vomiting, trauma, falls, fevers, chills, abdominal pain, and headache.    Limitations to history: None  Independent historian(s): None  Records Reviewed: Recent available ED and inpatient notes reviewed in EMR.    PMHx/PSHx:  Per HPI.   - has a past medical history of Aortic aneurysm (CMS-[facility]), Diabetes mellitus (Multi), and Hypertension.  - has a past surgical history that includes Bypass Graft.    Medications:  Reviewed in EMR. See EMR for complete list of medications and doses.    Allergies:  Ciprofloxacin (bulk)    Social History:  - Tobacco:  reports that he quit smoking about 17 years ago. His smoking use included cigarettes. He has never used smokeless tobacco.   - Alcohol:  reports no history of alcohol use.   - Illicit Drugs:  reports no history of drug use.     ROS:  Per HPI.       ???????????????????????????????????????????????????????????????  Triage Vitals:  T 36.2 °C (97.2 °F)  HR 75  /85  RR 16  O2 100 %      Physical Exam    General: Patient resting comfortably in bed, no acute distress, breathing easily, well appearing, and appropriately conversational without confusion or gross mental  status changes.  Head: Normocephalic. Atraumatic.    Neck: FROM. No gross masses.   Eyes: EOMI. No scleral icterus or injection.  ENT: Moist mucous membranes, no apparent trauma or lesions.  CV: Regular rhythm. No murmurs, rubs, gallops appreciated. 2+ radial pulses bilaterally.  Resp: Clear to auscultation bilaterally. No respiratory distress.   GI: Soft, non-distended. No tenderness with palpation. No rebound tenderness or guarding. No palpable masses.  : No suprapubic or CVA tenderness.  MSK: No gross step offs or deformities.  EXT: Right hand in dressing.  No active drainage or infectious appearance of degloving site.  No peripheral edema, contusions, or wounds.  Skin: Warm and dry, no rashes or lesions.  Neuro: No focal neurological deficits from stated baseline. Speech fluent.  Psych: Appropriate mood and behavior, converses and responds appropriately.    ???????????????????????????????????????????????????????????????  Labs:   Labs Reviewed   CBC WITH AUTO DIFFERENTIAL - Abnormal       Result Value    WBC 8.4      nRBC 0.0      RBC 4.77      Hemoglobin 13.4 (*)     Hematocrit 39.4 (*)     MCV 83      MCH 28.1      MCHC 34.0      RDW 13.6      Platelets 209      Neutrophils % 58.3      Immature Granulocytes %, Automated 0.4      Lymphocytes % 25.6      Monocytes % 10.3      Eosinophils % 4.6      Basophils % 0.8      Neutrophils Absolute 4.91      Immature Granulocytes Absolute, Automated 0.03      Lymphocytes Absolute 2.16      Monocytes Absolute 0.87 (*)     Eosinophils Absolute 0.39      Basophils Absolute 0.07     COMPREHENSIVE METABOLIC PANEL - Abnormal    Glucose 74      Sodium 139      Potassium 4.5      Chloride 105      Bicarbonate 25      Anion Gap 14      Urea Nitrogen 24 (*)     Creatinine 1.13      eGFR 67      Calcium 9.7      Albumin 3.9      Alkaline Phosphatase 65      Total Protein 6.9      AST 12      Bilirubin, Total 0.3      ALT 11     SERIAL TROPONIN-INITIAL - Normal    Troponin I, High  Sensitivity 5      Narrative:     Less than 99th percentile of normal range cutoff-  Female and children under 18 years old <35 ng/L; Male <54 ng/L: Negative  Repeat testing should be performed if clinically indicated.     Female and children under 18 years old  ng/L; Male  ng/L:  Consistent with possible cardiac damage and possible increased clinical   risk. Serial measurements may help to assess extent of myocardial damage.     >120 ng/L: Consistent with cardiac damage, increased clinical risk and  myocardial infarction. Serial measurements may help assess extent of   myocardial damage.      NOTE: Children less than 1 year old may have higher baseline troponin   levels and results should be interpreted in conjunction with the overall   clinical context.    NOTE: Troponin I testing is performed using a different   testing methodology at [facility] than at other   [facility]. Direct result comparisons should only   be made within the same method.     TROPONIN SERIES- (INITIAL, 1 HR)    Narrative:     The following orders were created for panel order Troponin I Series, High Sensitivity (0, 1 HR).  Procedure                               Abnormality         Status                     ---------                               -----------         ------                     Troponin I, High Sensiti...[[id number]]  Normal              Final result               Troponin, High Sensitivi...[[id number]]                                                   Please view results for these tests on the individual orders.   SERIAL TROPONIN, 1 HOUR        Imaging:   No orders to display        EKG:  Rate is 82, sinus rhythm, normal axis, no interval prolongation, no st elevation or depression.  No previous EKG for comparison.  Review of EKG does not show any signs of STEMI, complete heart block, asystole, V-fib.    MDM:  Patient is a 78-year-old male with a history of HTN, T2DM, stroke on DAPT, and CAD s/p  bypass X4 who presented to the ED for irregular heartbeat.  Patient is HDS.  Physical exam findings significant for a 78-year-old male in no acute distress at his baseline.  Low clinical concern for ACS with unremarkable EKG, normal troponin, and no chest pain.  EKG and telemetry did not display dysrhythmia, PVCs, heart block, or bradycardia.  Patient has noted to have a slightly elevated BUN likely secondary to the patient not eating or drinking since this morning.  Electrolytes were within normal limits.  CBC did not show a leukocytosis or anemia.  Patient continues to note to be at his mental and physical baseline.  Low clinical concern for an infectious process at the patient's degloving site.  Patient notes that he has cardiology follow-up scheduled for this Thursday.  Upon shared decision-making with the patient, discussed observation admission for further cardiac evaluation versus discharge home with close cardiology follow-up.  Patient stated that he preferred to go home and follow-up with his cardiologist.  Patient does note that he lives 2 hours away.  Patient notes that he is able to reach his cardiologist and will contact them tomorrow for expedited follow-up.  Discussed ED findings, plan for outpatient cardiology follow-up, and strict return precautions for any new or worsening symptoms including but not limited to lightheadedness, syncope, and chest pain.  Patient stated understanding and agreed with the plan.  All questions were answered.  Patient discharged in stable condition.    ED Course:  Diagnoses as of 04/15/24 1746   Irregular heart beat       Social Determinants Limiting Care:  None identified    Disposition:  Discharge    Ruddy Hill MD   Emergency Medicine PGY-2  [facility]    Comment: Please note this report has been produced using speech recognition software and may contain errors related to that system including errors in grammar, punctuation, and  spelling as well as words and phrases that may be inappropriate.  If there are any questions or concerns please feel free to contact the dictating provider for clarification.    Procedures ? Smart[employer] last updated 4/15/2024 5:39 PM         ATTENDING ATTESTATION  Gentleman presenting to the emergency department on referral from plastic surgery for evaluation of possible bradycardia.  Patient was at a follow-up appointment today for a wound that he had been having addressed following a degloving injury, he was completely asymptomatic today, his heart rate was noted to fluctuate from 75 down to 45, the patient at no point had near syncope chest pain trouble breathing he has been in usual state of health no nausea vomiting or diarrhea has 0 complaints.  Patient was actually confused with why he was sent here.  EKG reassuring nonischemic no sign of block no sign of ectopy.  Electrolytes normal, blood counts unremarkable.  Patient did have a slight BUN elevation he has not had anything to eat or drink over the past 12 hours, they live 2 hours away and had quite a distance to travel to get to his appointment.  Patient aside from being hungry has no complaints.  He has outpatient follow-up already with cardiology we observed him on telemetry without dysrhythmia safer discharge monitor for symptoms return with concerns    EKG reviewed independently by me and agree with interpretation above.    Floyd Krishnan, [facility]  [facility]    The patient was seen by the resident/fellow.  I have personally performed a substantive portion of the encounter.  I have seen and examined the patient; agree with the workup, evaluation, MDM, management and diagnosis.    I have reviewed all the nurses' notes and have confirmed their findings, and have incorporated those findings into this medical record.   The care plan has been discussed with the resident/fellow; I have reviewed the  resident/fellow’s note and agree with the documented findings with the exception/addition of information listed above.  On my own examination I agree and incorporated in this document my own history, examination findings and clinical decision making.  All notation in this Addendum supersedes information presented by the resident or CAITLYN as listed above.        Ruddy Hill MD  Resident  04/17/24 0402

## 2024-04-15 NOTE — PROGRESS NOTES
Department of Plastic and Reconstructive Surgery  Clinic Visit Note    Patient Name: Peter Loredo  MRN: [id number]  Date:  04/15/24     HPI    Peter Loredo is a 78 y.o. male with a history of HTN, DM mini strokes on DAPT (ASA 81, plavix), CAD (bypass x4), chronic balance issues, presented to [facility] ED on 3/5 with right hand injury after he fell against a door handle, avulsing the skin of the right hand dorsum. X-ray with no foreign body or fracture. Ortho hand consulted in the ED and recommended light irrigation, tacking stitches, xeroform, bulky gauze, and a volar splint. Prior to repair, R hand dorsum with visible vasculature and extensor tendons. Repaired in the ED with 5-0 prolene. Referred to plastic surgery for wound evaluation and possible need for soft tissue coverage. Pt evaluated in plastics clinic 3/11 and 3/18 with some initial darkened discoloration of the distal edge which responded to tx with topical nitrobid paste. Staged removal of sutures completed to help further facilitate wound healing. Patient presents to clinic today for wound re-evaluation and remaining suture removal.     Subjective    Endorses overall feeling well. Family assisting with daily Xeroform dressing changes who have noted improved wound appearance and healing. No drainage, bleeding or dehiscence from site. Remaining sutures intact. Maintaining resting splint to limit hand/wrist ROM and tension on wound site.     Of note, pt noted with irregular HR ranging from low 40s-80s on VS obtained upon clinic presentation today. Denies hx of known arrhythmia but confirms cardiac hx of cardiac bypass graft. Takes plavix and ASA outpatient. Denies any symptoms including chest pain, dyspnea, palpitations, headache, lightheadedness, syncope, f/c, n/v or extremity tingling.     Past Medical History:   Diagnosis Date    Aortic aneurysm (CMS-HCC)     Diabetes mellitus (Multi)     Hypertension      Past Surgical History:   Procedure Laterality  Date    BYPASS GRAFT      X4     Allergies   Allergen Reactions    Ciprofloxacin (Bulk) Anaphylaxis       Current Outpatient Medications:     acetaminophen (Tylenol Arthritis Pain) 650 mg ER tablet, 2 tablet EVERY 8 HOURS (route: oral), Disp: , Rfl:     aspirin 81 mg EC tablet, 1 tablet DAILY (route: oral), Disp: , Rfl:     atorvastatin (Lipitor) 40 mg tablet, 1 tablet DAILY (route: oral), Disp: , Rfl:     clopidogrel (Plavix) 75 mg tablet, 1 tablet DAILY (route: oral), Disp: , Rfl:     cyanocobalamin (Vitamin B-12) 1,000 mcg/mL injection, 1 mL MONTHLY (route: injection), Disp: , Rfl:     insulin NPH and regular human (HumuLIN 70/30 U-100 KwikPen) 100 unit/mL (70-30) injection, Per instructions 2 TIMES DAILY (route: subcutaneous), Disp: , Rfl:     lisinopril 2.5 mg tablet, 1 tablet DAILY (route: oral), Disp: , Rfl:     metFORMIN (Glucophage) 1,000 mg tablet, 1 tablet 2 TIMES DAILY (route: oral), Disp: , Rfl:     metoprolol tartrate (Lopressor) 25 mg tablet, 12.5 mg DAILY (route: oral), Disp: , Rfl:     midodrine (Proamatine) 2.5 mg tablet, Take 1 tablet (2.5 mg) by mouth 3 times a day., Disp: , Rfl:     nitroglycerin (Nitro-Bid) 2 % ointment, Place 0.5 inches on the skin every 12 hours for 5 days., Disp: 1 g, Rfl: 0   No family history on file.  Social History     Tobacco Use    Smoking status: Former     Current packs/day: 0.00     Types: Cigarettes     Quit date:      Years since quittin.2    Smokeless tobacco: Never   Substance Use Topics    Alcohol use: Never    Drug use: Never     Review of Systems   Constitutional:  Negative for chills, diaphoresis, fatigue and fever.   HENT:  Negative for congestion and rhinorrhea.    Respiratory:  Negative for cough, chest tightness and shortness of breath.    Cardiovascular:  Negative for chest pain and palpitations.   Gastrointestinal:  Negative for abdominal distention, abdominal pain, constipation, diarrhea, nausea and vomiting.   Genitourinary:  Negative for  dysuria.   Musculoskeletal:  Negative for myalgias.   Skin:  Positive for wound. Negative for rash.   Neurological:  Negative for dizziness, syncope, weakness, light-headedness, numbness and headaches.   Hematological:  Does not bruise/bleed easily.   Psychiatric/Behavioral:  Negative for confusion and sleep disturbance.       Objective   /68 (BP Location: Left arm, Patient Position: Sitting, BP Cuff Size: Small adult)   Pulse 58   Temp 36.8 °C (98.3 °F) (Temporal)   SpO2 96%      Physical Exam  Constitutional: A&Ox3, calm and cooperative, NAD.  Eyes: EOMI, clear sclera.  ENMT: Moist mucous membranes, no apparent injuries or lesions.  Head/Neck: NC/AT. Neck supple.   Cardiovascular: Irregular rhythm, HR ranging from low 40s-80s. 2+ equal pulses of the distal extremities.  Respiratory/Thorax: Breathing comfortably with regular respirations on RA. Good symmetric chest expansion.   Gastrointestinal: Abdomen soft, non-tender.   Genitourinary: Voiding independently.   Extremities: Moves all 4 extremities actively, strength appropriate. Intact  of R hand. R hand/wrist immobilized in resting splint secured with Kerlix and Coban. Upon dressing removal, pt has a healed avulsion laceration to the dorsum of the R hand, x5 residual sutures removed and wound debrided of scabbed material. There is a small region at the distal edge of the previously avulsed tissue which is mildly pink and healing but remaining skin appears fully healed. Sensation over wound site intact, minimally TTP. No wound dehiscence, drainage, bleeding, necrosis or purulence.   Neurological: A&Ox3.   Psychological: Appropriate mood and behavior.   Skin: Warm and dry, no rashes. See extremity exam.             Diagnostics   No results found for this or any previous visit (from the past 24 hour(s)).  No results found.    Assessment   Peter Loredo is a 78 y.o. male with a history of HTN, DM mini strokes on DAPT (ASA 81, plavix), CAD (bypass x4),  chronic balance issues, presented to [facility] ED on 3/5 with right hand injury after he fell against a door handle, avulsing the skin of the right hand dorsum. X-ray with no foreign body or fracture. Ortho hand consulted in the ED and recommended light irrigation, tacking stitches, xeroform, bulky gauze, and a volar splint. Prior to repair, R hand dorsum with visible vasculature and extensor tendons. Repaired in the ED with 5-0 prolene. Referred to plastic surgery for wound evaluation and possible need for soft tissue coverage. Pt evaluated in plastics clinic 3/11 and 3/18 with some initial darkened discoloration of the distal edge which responded to tx with topical nitrobid paste. Staged removal of sutures completed to help further facilitate wound healing. Patient presents to clinic today for wound re-evaluation and remaining suture removal.     Wound overall significantly improved on re-exam today, sites nearly completely healed closed. All remaining sutures removed without complication and wound debrided of residual scabbed tissue. Not felt to require any additional acute surgical interventions from plastic surgery standpoint including STSG. Discussed continued local wound care until fully healed and follow up PRN moving forward.     Plan    - x5 remaining nonabsorbable sutures removed without complication   - Wound sites debrided of residual scabbed material   - Continue local wound care with Xeroform and guaze to small residual region of superficial healing tissue at mid dorsal hand for 3-5 days then may leave site COURTNEY thereafter  - Pt to continue to monitor sites for s/s of bleeding, infection, dehiscence or poor progression which should be reported to the plastic surgery office if they are to occur   - May DC use of resting splint and gradually resume RUE normal activities as tolerated   - Maintain RUE elevation for alleviation of edema   - OK to shower and wet sites but avoid prolonged submersion or scrubbing  until fully healed   - Wounds not felt to require any additional acute surgical interventions from plastic surgery standpoint including STSG, pt may follow up PRN moving forward  - Given irregular HR down to low 40s with cardiac hx, discussed presenting to ED to have ECG obtained for which pt and family were amenable to     Karin Jack PA-C  Plastic and Reconstructive Surgery

## 2024-04-15 NOTE — DISCHARGE INSTRUCTIONS
You presented to the ED with concern for an irregular heartbeat with bradycardia.  You were noted to have rate and rhythm of your heart in the emergency department.  You noted that you have cardiology follow-up on Thursday.  Please follow-up with cardiology as soon as possible.  We discussed possible admission with concern for bradycardia.  You are noted to have close follow-up with your cardiologist.  Please return to the emergency department for any new or worsening symptoms including but not limited to lightheadedness, loss of consciousness, and chest pain.